# Patient Record
Sex: FEMALE | Race: BLACK OR AFRICAN AMERICAN | NOT HISPANIC OR LATINO | Employment: UNEMPLOYED | URBAN - METROPOLITAN AREA
[De-identification: names, ages, dates, MRNs, and addresses within clinical notes are randomized per-mention and may not be internally consistent; named-entity substitution may affect disease eponyms.]

---

## 2017-04-12 ENCOUNTER — TELEPHONE (OUTPATIENT)
Dept: PAIN MEDICINE | Facility: CLINIC | Age: 67
End: 2017-04-12

## 2017-04-12 NOTE — TELEPHONE ENCOUNTER
----- Message from Julia Robbins sent at 4/12/2017 12:07 PM CDT -----  Patient is requesting a Rx for tramadol please call [phone]    [facility] - [facility] - [address] [address]  [address] [address]  [place] [id number]  Phone: [phone] Fax: [phone]

## 2017-04-12 NOTE — TELEPHONE ENCOUNTER
Spoke with the patient regarding her pain medication request and reviewed the last office visit. Advised that in that visit she was instructed that no controlled medications would be prescribed. She stated that she only needs a few to get the pain under control. Advised that once the provider says no, he will not change his mind. We can schedule a follow up to discuss and patient declined.

## 2020-08-12 ENCOUNTER — OFFICE VISIT (OUTPATIENT)
Dept: ALLERGY | Facility: CLINIC | Age: 70
End: 2020-08-12
Payer: MEDICARE

## 2020-08-12 VITALS — OXYGEN SATURATION: 93 % | BODY MASS INDEX: 21.64 KG/M2 | HEIGHT: 65 IN | HEART RATE: 93 BPM | WEIGHT: 129.88 LBS

## 2020-08-12 DIAGNOSIS — J45.40 MODERATE PERSISTENT ASTHMA WITHOUT COMPLICATION: Primary | ICD-10-CM

## 2020-08-12 DIAGNOSIS — J34.2 DEVIATED NASAL SEPTUM: ICD-10-CM

## 2020-08-12 DIAGNOSIS — J31.0 CHRONIC RHINITIS: ICD-10-CM

## 2020-08-12 PROCEDURE — 99999 PR PBB SHADOW E&M-NEW PATIENT-LVL III: CPT | Mod: PBBFAC,,, | Performed by: ALLERGY & IMMUNOLOGY

## 2020-08-12 PROCEDURE — 99999 PR PBB SHADOW E&M-NEW PATIENT-LVL III: ICD-10-PCS | Mod: PBBFAC,,, | Performed by: ALLERGY & IMMUNOLOGY

## 2020-08-12 PROCEDURE — 1159F PR MEDICATION LIST DOCUMENTED IN MEDICAL RECORD: ICD-10-PCS | Mod: S$GLB,,, | Performed by: ALLERGY & IMMUNOLOGY

## 2020-08-12 PROCEDURE — 99204 PR OFFICE/OUTPT VISIT, NEW, LEVL IV, 45-59 MIN: ICD-10-PCS | Mod: S$GLB,,, | Performed by: ALLERGY & IMMUNOLOGY

## 2020-08-12 PROCEDURE — 99204 OFFICE O/P NEW MOD 45 MIN: CPT | Mod: S$GLB,,, | Performed by: ALLERGY & IMMUNOLOGY

## 2020-08-12 PROCEDURE — 3008F BODY MASS INDEX DOCD: CPT | Mod: CPTII,S$GLB,, | Performed by: ALLERGY & IMMUNOLOGY

## 2020-08-12 PROCEDURE — 1159F MED LIST DOCD IN RCRD: CPT | Mod: S$GLB,,, | Performed by: ALLERGY & IMMUNOLOGY

## 2020-08-12 PROCEDURE — 3008F PR BODY MASS INDEX (BMI) DOCUMENTED: ICD-10-PCS | Mod: CPTII,S$GLB,, | Performed by: ALLERGY & IMMUNOLOGY

## 2020-08-12 NOTE — PROGRESS NOTES
Subjective:       Patient ID: Arabella Mensah is a 69 y.o. female.    Chief Complaint:  Allergies (drainage, hx of allergy shots, dr kennedy)      68 yo woman presents for new patent evaluation of allergy and asthma. She followed with Dr Kennedy for years and did allergy shots. She c/o lots of PND and cough to bring up mucus. occ sneeze and runny nose but not as much. Rare itchy eyes. No SOB or wheeze. She is using albuterol BID and Advair prn, she says was how she was told. She has Nasonex and thinks Astelin and uses both 1 SEN BID. No antihistamines. She has H/o GERD and has Nexium but does not take daily. She has H/o CT scan showing infection and she thinks she may have again an is wondering about CT scan. She has no eczema. No known food, insect or latex allergy. She has HTN and couple different medications including beta blocker. She has some numbness and tingling off and on even in head.       Environmental History: see history section for home environment  Review of Systems   Constitutional: Negative for appetite change, chills, fatigue and fever.   HENT: Positive for postnasal drip, rhinorrhea, sneezing and sore throat. Negative for congestion, ear discharge, ear pain, facial swelling, nosebleeds, sinus pressure, trouble swallowing and voice change.    Eyes: Negative for discharge, redness, itching and visual disturbance.   Respiratory: Positive for cough. Negative for choking, chest tightness, shortness of breath and wheezing.    Cardiovascular: Negative for chest pain, palpitations and leg swelling.   Gastrointestinal: Negative for abdominal distention, abdominal pain, constipation, diarrhea, nausea and vomiting.   Genitourinary: Negative for difficulty urinating.   Musculoskeletal: Positive for arthralgias. Negative for gait problem, joint swelling and myalgias.   Skin: Negative for color change and rash.   Neurological: Positive for numbness. Negative for dizziness, syncope, weakness, light-headedness and  headaches.   Hematological: Negative for adenopathy. Does not bruise/bleed easily.   Psychiatric/Behavioral: Negative for agitation, behavioral problems, confusion and sleep disturbance. The patient is not nervous/anxious.         Objective:      Physical Exam  Vitals signs and nursing note reviewed.   Constitutional:       General: She is not in acute distress.     Appearance: She is well-developed.   HENT:      Head: Normocephalic and atraumatic.      Right Ear: Hearing, tympanic membrane, ear canal and external ear normal.      Left Ear: Hearing, tympanic membrane, ear canal and external ear normal.      Nose: No septal deviation, mucosal edema or rhinorrhea.      Right Sinus: No maxillary sinus tenderness or frontal sinus tenderness.      Left Sinus: No maxillary sinus tenderness or frontal sinus tenderness.      Mouth/Throat:      Pharynx: Uvula midline. No uvula swelling.   Eyes:      General:         Right eye: No discharge.         Left eye: No discharge.      Conjunctiva/sclera: Conjunctivae normal.   Neck:      Musculoskeletal: Normal range of motion.      Thyroid: No thyromegaly.   Cardiovascular:      Rate and Rhythm: Normal rate and regular rhythm.      Heart sounds: Normal heart sounds. No murmur.   Pulmonary:      Effort: Pulmonary effort is normal. No respiratory distress.      Breath sounds: Normal breath sounds. No wheezing.   Abdominal:      General: There is no distension.      Palpations: Abdomen is soft.      Tenderness: There is no abdominal tenderness.   Musculoskeletal: Normal range of motion.         General: No tenderness.   Lymphadenopathy:      Cervical: No cervical adenopathy.   Skin:     General: Skin is warm and dry.      Findings: No erythema or rash.   Neurological:      Mental Status: She is alert and oriented to person, place, and time.   Psychiatric:         Behavior: Behavior normal.         Thought Content: Thought content normal.         Judgment: Judgment normal.          Laboratory:   none performed   Assessment:       1. Moderate persistent asthma without complication    2. Chronic rhinitis    3. Deviated nasal septum         Plan:       1. Immunocaps today  2. CT scan  3. Advised needs to chandana Advair 1 puff BID and albuterol as needed  4. Continue Nasonex and azelastine 1 SNE BID of both  5. May need montelukast or oral H1 blocker, will determine with results  6. advised to se PCP about numbness and tingling  7. Phone review

## 2020-08-13 ENCOUNTER — TELEPHONE (OUTPATIENT)
Dept: ALLERGY | Facility: CLINIC | Age: 70
End: 2020-08-13

## 2020-08-13 NOTE — TELEPHONE ENCOUNTER
Attempted to call pt, no answer, voicemail was full.       ----- Message from Opal Lebron sent at 8/13/2020 10:40 AM CDT -----  Regarding: Results  Contact: Patient  Type:  Test Results    Who Called:  patient  Name of Test (Lab/Mammo/Etc):  lab  Date of Test:  8/12  [facility]  Where the test was performed:  [facility]  Best Call Back Number:  [phone]  Additional Information:

## 2020-08-14 ENCOUNTER — TELEPHONE (OUTPATIENT)
Dept: ALLERGY | Facility: CLINIC | Age: 70
End: 2020-08-14

## 2020-08-14 NOTE — TELEPHONE ENCOUNTER
Left message telling pt how to schedule test and that we would call back next week with results.     ----- Message from Marizol Post sent at 8/14/2020  9:09 AM CDT -----  Contact: call pt [phone]    Type:  Sooner Apoointment Request    Caller is requesting a sooner appointment.  Caller declined first available appointment listed below.  Caller will not accept being placed on the waitlist and is requesting a message be sent to doctor.    Name of Caller:  pt  When is the first available appointment?    Calling to reschedule   ctscan  Symptoms:   reschedule ctscan  Best Call Back Number: call pt [phone]  Additional Information:   please call   pt  also  needs  blood  wk  results

## 2020-08-20 ENCOUNTER — HOSPITAL ENCOUNTER (OUTPATIENT)
Dept: RADIOLOGY | Facility: HOSPITAL | Age: 70
Discharge: HOME | End: 2020-08-20
Attending: ALLERGY & IMMUNOLOGY
Payer: MEDICARE

## 2020-08-20 ENCOUNTER — TELEPHONE (OUTPATIENT)
Dept: ALLERGY | Facility: CLINIC | Age: 70
End: 2020-08-20

## 2020-08-20 DIAGNOSIS — J34.2 DEVIATED NASAL SEPTUM: ICD-10-CM

## 2020-08-20 DIAGNOSIS — J31.0 CHRONIC RHINITIS: ICD-10-CM

## 2020-08-20 PROCEDURE — 70486 CT MAXILLOFACIAL W/O DYE: CPT | Mod: 26,,, | Performed by: RADIOLOGY

## 2020-08-20 PROCEDURE — 70486 CT MAXILLOFACIAL W/O DYE: CPT | Mod: TC,PO

## 2020-08-20 PROCEDURE — 70486 CT MAXILLOFACIAL WITHOUT CONTRAST: ICD-10-PCS | Mod: 26,,, | Performed by: RADIOLOGY

## 2020-08-20 NOTE — TELEPHONE ENCOUNTER
Please let her know she is allergic to dust mites rest of tests negative  Dust mites are microscopic insects, so small you cant see them and they cant bite but they live in our beds, carpet and upholstered furniture. They have nothing to do with cleanliness. We are most exposed by our beds, at night breathe this allergen in all night. She would benefit from Dust mite avoidance - zippered covers over pillows, mattress and box springs.  Can purchase at [employer], [employer], [employer], or www.[employer].   Place sheets over allergy covers   Wash sheets in hot water weekly   Her CT does she some fluid and thickening ins sinuses which can be a chronic infection. Would like to treat her with 3 weeks antibiotics to see if this improves her sinuses and if not then would need to see ENT. Please confirm pharmacy and will sen din medications

## 2020-09-21 ENCOUNTER — OFFICE VISIT (OUTPATIENT)
Dept: ALLERGY | Facility: CLINIC | Age: 70
End: 2020-09-21
Payer: MEDICARE

## 2020-09-21 VITALS — BODY MASS INDEX: 20.32 KG/M2 | HEIGHT: 65 IN | HEART RATE: 63 BPM | WEIGHT: 121.94 LBS | OXYGEN SATURATION: 95 %

## 2020-09-21 DIAGNOSIS — J30.89 ALLERGIC RHINITIS DUE TO DUST MITE: Primary | ICD-10-CM

## 2020-09-21 DIAGNOSIS — J45.40 MODERATE PERSISTENT ASTHMA WITHOUT COMPLICATION: ICD-10-CM

## 2020-09-21 DIAGNOSIS — J32.3 CHRONIC SPHENOIDAL SINUSITIS: ICD-10-CM

## 2020-09-21 PROCEDURE — 99999 PR PBB SHADOW E&M-EST. PATIENT-LVL III: ICD-10-PCS | Mod: PBBFAC,,, | Performed by: ALLERGY & IMMUNOLOGY

## 2020-09-21 PROCEDURE — 3008F PR BODY MASS INDEX (BMI) DOCUMENTED: ICD-10-PCS | Mod: CPTII,S$GLB,, | Performed by: ALLERGY & IMMUNOLOGY

## 2020-09-21 PROCEDURE — 99214 PR OFFICE/OUTPT VISIT, EST, LEVL IV, 30-39 MIN: ICD-10-PCS | Mod: S$GLB,,, | Performed by: ALLERGY & IMMUNOLOGY

## 2020-09-21 PROCEDURE — 99999 PR PBB SHADOW E&M-EST. PATIENT-LVL III: CPT | Mod: PBBFAC,,, | Performed by: ALLERGY & IMMUNOLOGY

## 2020-09-21 PROCEDURE — 1159F PR MEDICATION LIST DOCUMENTED IN MEDICAL RECORD: ICD-10-PCS | Mod: S$GLB,,, | Performed by: ALLERGY & IMMUNOLOGY

## 2020-09-21 PROCEDURE — 99214 OFFICE O/P EST MOD 30 MIN: CPT | Mod: S$GLB,,, | Performed by: ALLERGY & IMMUNOLOGY

## 2020-09-21 PROCEDURE — 3008F BODY MASS INDEX DOCD: CPT | Mod: CPTII,S$GLB,, | Performed by: ALLERGY & IMMUNOLOGY

## 2020-09-21 PROCEDURE — 1159F MED LIST DOCD IN RCRD: CPT | Mod: S$GLB,,, | Performed by: ALLERGY & IMMUNOLOGY

## 2020-09-21 RX ORDER — AMOXICILLIN AND CLAVULANATE POTASSIUM 875; 125 MG/1; MG/1
1 TABLET, FILM COATED ORAL 2 TIMES DAILY
Qty: 42 TABLET | Refills: 0 | Status: SHIPPED | OUTPATIENT
Start: 2020-09-21 | End: 2020-10-12

## 2020-09-21 NOTE — PROGRESS NOTES
Subjective:       Patient ID: Arabella Mensah is a 70 y.o. female.    Chief Complaint:  No chief complaint on file.      69 yo woman presents for continued evaluation of allergic rhinitis and asthma. She was last seen 8/12/2020. She had immunocaps positive to dust mites. She has CT scan which showed mucus in sphenoid and ethmoid sinuses. Staff tried to reach her by phone with result and consider treatment with long course antibiotics but never could be reached. She is on Advair 100 BID, Nasonex and azelastine both 1 SEN BID. She still has congestion and pressure with lots of PND and cough to bring up mucus. occ sneeze and runny nose but not as much. Rare itchy eyes. No SOB or wheeze.    Prior History taken 8/12/2020:  new patent evaluation of allergy and asthma. She followed with Dr Kennedy for years and did allergy shots. She c/o lots of PND and cough to bring up mucus. occ sneeze and runny nose but not as much. Rare itchy eyes. No SOB or wheeze. She is using albuterol BID and Advair prn, she says was how she was told. She has Nasonex and thinks Astelin and uses both 1 SEN BID. No antihistamines. She has H/o GERD and has Nexium but does not take daily. She has H/o CT scan showing infection and she thinks she may have again an is wondering about CT scan. She has no eczema. No known food, insect or latex allergy. She has HTN and couple different medications including beta blocker. She has some numbness and tingling off and on even in head.       Environmental History: see history section for home environment  Review of Systems   Constitutional: Negative for appetite change, chills, fatigue and fever.   HENT: Positive for postnasal drip, rhinorrhea, sneezing and sore throat. Negative for congestion, ear discharge, ear pain, facial swelling, nosebleeds, sinus pressure, trouble swallowing and voice change.    Eyes: Negative for discharge, redness, itching and visual disturbance.   Respiratory: Positive for cough. Negative  for choking, chest tightness, shortness of breath and wheezing.    Cardiovascular: Negative for chest pain, palpitations and leg swelling.   Gastrointestinal: Negative for abdominal distention, abdominal pain, constipation, diarrhea, nausea and vomiting.   Genitourinary: Negative for difficulty urinating.   Musculoskeletal: Positive for arthralgias. Negative for gait problem, joint swelling and myalgias.   Skin: Negative for color change and rash.   Neurological: Positive for numbness. Negative for dizziness, syncope, weakness, light-headedness and headaches.   Hematological: Negative for adenopathy. Does not bruise/bleed easily.   Psychiatric/Behavioral: Negative for agitation, behavioral problems, confusion and sleep disturbance. The patient is not nervous/anxious.         Objective:      Physical Exam  Vitals signs and nursing note reviewed.   Constitutional:       General: She is not in acute distress.     Appearance: She is well-developed.   HENT:      Head: Normocephalic and atraumatic.      Right Ear: Hearing, tympanic membrane, ear canal and external ear normal.      Left Ear: Hearing, tympanic membrane, ear canal and external ear normal.      Nose: No septal deviation, mucosal edema or rhinorrhea.      Right Sinus: No maxillary sinus tenderness or frontal sinus tenderness.      Left Sinus: No maxillary sinus tenderness or frontal sinus tenderness.      Mouth/Throat:      Pharynx: Uvula midline. No uvula swelling.   Eyes:      General:         Right eye: No discharge.         Left eye: No discharge.      Conjunctiva/sclera: Conjunctivae normal.   Neck:      Musculoskeletal: Normal range of motion.      Thyroid: No thyromegaly.   Cardiovascular:      Rate and Rhythm: Normal rate and regular rhythm.      Heart sounds: Normal heart sounds. No murmur.   Pulmonary:      Effort: Pulmonary effort is normal. No respiratory distress.      Breath sounds: Normal breath sounds. No wheezing.   Abdominal:      General:  There is no distension.      Palpations: Abdomen is soft.      Tenderness: There is no abdominal tenderness.   Musculoskeletal: Normal range of motion.         General: No tenderness.   Lymphadenopathy:      Cervical: No cervical adenopathy.   Skin:     General: Skin is warm and dry.      Findings: No erythema or rash.   Neurological:      Mental Status: She is alert and oriented to person, place, and time.   Psychiatric:         Behavior: Behavior normal.         Thought Content: Thought content normal.         Judgment: Judgment normal.         Laboratory:   none performed   Assessment:       1. Allergic rhinitis due to dust mite    2. Moderate persistent asthma without complication    3. Chronic sphenoidal sinusitis         Plan:       1. Dust mite avoidance, measures discussed and handout provided.  2. Augmentin BID for 3 weeks  3. Advair 100 1 puff BID and albuterol as needed  4. Continue Nasonex and azelastine 1 SEN BID of both  5. May need montelukast or oral H1 blocker, will determine if symptoms not resolved post antibiotic